# Patient Record
Sex: MALE | Race: WHITE | HISPANIC OR LATINO | URBAN - METROPOLITAN AREA
[De-identification: names, ages, dates, MRNs, and addresses within clinical notes are randomized per-mention and may not be internally consistent; named-entity substitution may affect disease eponyms.]

---

## 2017-09-18 ENCOUNTER — EMERGENCY (EMERGENCY)
Facility: HOSPITAL | Age: 32
LOS: 1 days | Discharge: HOME | End: 2017-09-18
Attending: EMERGENCY MEDICINE | Admitting: EMERGENCY MEDICINE
Payer: COMMERCIAL

## 2017-09-18 VITALS
OXYGEN SATURATION: 98 % | SYSTOLIC BLOOD PRESSURE: 132 MMHG | RESPIRATION RATE: 18 BRPM | WEIGHT: 179.9 LBS | HEIGHT: 73 IN | HEART RATE: 86 BPM | TEMPERATURE: 98 F | DIASTOLIC BLOOD PRESSURE: 87 MMHG

## 2017-09-18 DIAGNOSIS — F41.9 ANXIETY DISORDER, UNSPECIFIED: ICD-10-CM

## 2017-09-18 DIAGNOSIS — F41.0 PANIC DISORDER [EPISODIC PAROXYSMAL ANXIETY]: ICD-10-CM

## 2017-09-18 PROCEDURE — 99284 EMERGENCY DEPT VISIT MOD MDM: CPT | Mod: 25

## 2017-09-18 RX ORDER — ALPRAZOLAM 0.25 MG
0.5 TABLET ORAL ONCE
Qty: 0 | Refills: 0 | Status: DISCONTINUED | OUTPATIENT
Start: 2017-09-18 | End: 2017-09-18

## 2017-09-18 RX ADMIN — Medication 0.5 MILLIGRAM(S): at 22:27
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
## 2017-09-18 NOTE — ED PROVIDER NOTE - MEDICAL DECISION MAKING DETAILS
Pt given Xanax for acute panic attack.  Discussed anxiety at length.  He ultimately feels better and requests discharge.  Instructed to f/u with his PCP and therapist and continue his work on CBT.  Pt is happy with d/c plan and promises to f/u.
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
## 2019-02-25 PROBLEM — Z00.00 ENCOUNTER FOR PREVENTIVE HEALTH EXAMINATION: Status: ACTIVE | Noted: 2019-02-25

## 2019-02-25 PROBLEM — F41.9 ANXIETY DISORDER, UNSPECIFIED: Chronic | Status: ACTIVE | Noted: 2017-09-18

## 2019-03-01 ENCOUNTER — OUTPATIENT (OUTPATIENT)
Dept: OUTPATIENT SERVICES | Facility: HOSPITAL | Age: 34
LOS: 1 days | End: 2019-03-01

## 2019-03-01 ENCOUNTER — APPOINTMENT (OUTPATIENT)
Dept: MRI IMAGING | Facility: CLINIC | Age: 34
End: 2019-03-01
Payer: COMMERCIAL

## 2019-03-01 PROCEDURE — 70551 MRI BRAIN STEM W/O DYE: CPT | Mod: 26

## 2024-07-29 ENCOUNTER — APPOINTMENT (OUTPATIENT)
Dept: COLORECTAL SURGERY | Facility: CLINIC | Age: 39
End: 2024-07-29
Payer: MEDICAID

## 2024-07-29 VITALS
BODY MASS INDEX: 25.18 KG/M2 | OXYGEN SATURATION: 98 % | DIASTOLIC BLOOD PRESSURE: 77 MMHG | SYSTOLIC BLOOD PRESSURE: 126 MMHG | HEIGHT: 73 IN | WEIGHT: 190 LBS | HEART RATE: 87 BPM | RESPIRATION RATE: 16 BRPM

## 2024-07-29 VITALS
HEART RATE: 87 BPM | BODY MASS INDEX: 25.18 KG/M2 | HEIGHT: 73 IN | SYSTOLIC BLOOD PRESSURE: 166 MMHG | OXYGEN SATURATION: 98 % | DIASTOLIC BLOOD PRESSURE: 81 MMHG | WEIGHT: 190 LBS

## 2024-07-29 DIAGNOSIS — Z87.2 PERSONAL HISTORY OF DISEASES OF THE SKIN AND SUBCUTANEOUS TISSUE: ICD-10-CM

## 2024-07-29 DIAGNOSIS — K64.9 UNSPECIFIED HEMORRHOIDS: ICD-10-CM

## 2024-07-29 DIAGNOSIS — Z86.59 PERSONAL HISTORY OF OTHER MENTAL AND BEHAVIORAL DISORDERS: ICD-10-CM

## 2024-07-29 DIAGNOSIS — Z78.9 OTHER SPECIFIED HEALTH STATUS: ICD-10-CM

## 2024-07-29 DIAGNOSIS — F16.10 HALLUCINOGEN ABUSE, UNCOMPLICATED: ICD-10-CM

## 2024-07-29 DIAGNOSIS — F19.90 OTHER PSYCHOACTIVE SUBSTANCE USE, UNSPECIFIED, UNCOMPLICATED: ICD-10-CM

## 2024-07-29 PROCEDURE — 99204 OFFICE O/P NEW MOD 45 MIN: CPT | Mod: 25

## 2024-07-29 PROCEDURE — 46600 DIAGNOSTIC ANOSCOPY SPX: CPT

## 2024-07-29 NOTE — ASSESSMENT
[FreeTextEntry1] : Exam findings and diagnosis were discussed at length with patient.  Recommendations including increased fiber intake, adequate daily hydration, and sitz baths as needed and after bowel movements were discussed. Avoid constipation and diarrhea, avoid pushing/straining. Recommend fiber supplement to bulk stool.  Toilet behaviors discussed. Recommend decreasing time sitting on toilet. Medical management, such as topical cream/suppositories, was discussed. Office based treatment and surgery were discussed. Risks and benefits discussed. Recommend rubber band ligation. Patient understands multiple treatments may be performed for multiple hemorrhoids. He states he is interested in treatment, but requests starting treatment at a future visit and will schedule a follow up appointment.  All questions answered, patient expressed understanding and is agreeable to this plan.

## 2024-07-29 NOTE — HISTORY OF PRESENT ILLNESS
[FreeTextEntry1] : 39yo male presents for initial evaluation  PCP Dr Osullivan  [facility] anxiety (treating with psychotherapy), ezcema, dermatofibrosarcoma (left upper back) Denies medications.  PSH excision of dermatofibrosarcoma at age 10 in [place]  Social - denies tobacco use, reports prior use of LSD and MDMA in past/around 2016, drinks 3-6 alcoholic beverages per week.  Referred by PCP for evaluation of hemorrhoids  Patient thinks he has had hemorrhoids since 2014. Has used preparation H in past.  Denies anorectal pain. Patient reports episodes of BRBPR with BM, occurring on and off about 1 year ago and now occurring more frequently in past month. Sometimes in toilet bowl, sometimes with wiping.  Last saw BRB on TP this week. Last week saw in toilet bowl.  BM are daily, every morning. States stools are looser at baseline.  Denies taking fiber supplements or antidiarrheal medication. Denies stool softeners or laxatives.  Admits to sitting on toilet for long time, has a morning routine when he checks work emails on toilet.   Denies family history of CRC or IBD. Denies prior colonoscopy.  (+) sexually active, denies anally receptive intercourse or play.   Denies ASA or anticoagulation.

## 2024-07-29 NOTE — PHYSICAL EXAM
[FreeTextEntry1] : Medical assistant present for duration of physical examination   General no acute distress, alert and oriented Psych calm, anxious at times, pleasant demeanor, responding appropriately to questions Nonlabored breathing Ambulating without assistance Skin normal color and pigment, no visible lesions or rashes    Anorectal Exam: Inspection no erythema, induration or fluctuance, no skin excoriation, no fissure, soft external hemorrhoids without acute inflammation or thrombosis NAVI nontender, no masses palpated, no blood on gloved finger   Procedure: Anoscopy   Pre procedure Diagnosis: hemorrhoids Post procedure Diagnosis: hemorrhoids  Anesthesia: none Estimated blood loss: none Specimen: none Complications: none   Consent obtained. Anoscopy was performed by passing a lighted anoscope with lubricant jelly into the anal canal and the entire anal mucosal surface was inspected. Findings included no fissure, moderate to large internal hemorrhoids with inflammation, bleeding seen from mucosa of right anterior hemorrhoid   Patient tolerated examination and procedure well.

## 2024-08-19 ENCOUNTER — APPOINTMENT (OUTPATIENT)
Dept: COLORECTAL SURGERY | Facility: CLINIC | Age: 39
End: 2024-08-19
Payer: MEDICAID

## 2024-08-19 ENCOUNTER — TRANSCRIPTION ENCOUNTER (OUTPATIENT)
Age: 39
End: 2024-08-19

## 2024-08-19 VITALS
RESPIRATION RATE: 16 BRPM | BODY MASS INDEX: 25.18 KG/M2 | OXYGEN SATURATION: 97 % | WEIGHT: 190 LBS | DIASTOLIC BLOOD PRESSURE: 86 MMHG | TEMPERATURE: 98.2 F | SYSTOLIC BLOOD PRESSURE: 153 MMHG | HEIGHT: 73 IN | HEART RATE: 71 BPM

## 2024-08-19 DIAGNOSIS — K64.9 UNSPECIFIED HEMORRHOIDS: ICD-10-CM

## 2024-08-19 PROCEDURE — 46221 LIGATION OF HEMORRHOID(S): CPT

## 2024-08-19 NOTE — HISTORY OF PRESENT ILLNESS
[FreeTextEntry1] : 37yo male presents for follow up  PCP Dr Osullivan  [facility] anxiety (treating with psychotherapy), ezcema, dermatofibrosarcoma (left upper back) Denies medications. PSH excision of dermatofibrosarcoma at age 10 in [place]  Social - denies tobacco use, reports prior use of LSD and MDMA in past/around 2016, drinks 3-6 alcoholic beverages per week.  Denies family history of CRC or IBD. Denies prior colonoscopy.  Seen 7/29/24 for initial evaluation of hemorrhoids, c/o episodes of BRBPR with BM on and off x 1 year, now occurring more frequently. Exam including anoscopy noted soft external hemorrhoids, moderate to large internal hemorrhoids with inflammation, bleeding seen from mucosa of right anterior hemorrhoid. Recommended rubber band ligation, patient interested in treatment, but requested to start treatment at a future visit.  Returns today in follow up Since last visit has been taking Metamucil regularly. Stools are more formed. BRB has also improved while on Metamucil. Sees BRB 1-2 times per week  Noticed worse bleeding and increased frequency when drinking alcohol and taking Metamucil, so patient has stopped taking Metamucil when he goes out and plans to drink alcohol. BMs typically daily.  Doing breathing exercises.  He reports he is anxious about doing treatments for hemorrhoids, but also states he wants to treat his symptoms and would like to proceed with rubber band ligation treatment, starting today.  Denies ASA or anticoagulation

## 2024-08-19 NOTE — PROCEDURE
[FreeTextEntry1] : Rubber Band Ligation  Pre-procedure Diagnosis: Symptomatic Internal Hemorrhoids Post-procedure Diagnosis: Symptomatic Internal Hemorrhoids Procedure: Anoscopy with Rubber Band Ligation Anesthesia: none Estimated blood loss: minimal Specimen: none Drain: none Complications: none  Indications: Patient presents with history of symptoms related to internal hemorrhoids. On physical exam, large internal hemorrhoids were detected. Risks/benefits/alternative were discussed and patient agreed to proceed with office based procedure.  Procedure Details: Consent obtained. Patient was placed in a modified prone annalisa-knife position on the examination table. Digital rectal exam was performed with an index finger with surgical jelly lubricant. An anoscope was inserted into the anal canal. Using the hemorrhoid ligation device, a rubber band was placed around the right anterior hemorrhoid. No active bleeding was noted. The anoscope was removed. The patient tolerated the procedure well.

## 2024-08-19 NOTE — ASSESSMENT
[FreeTextEntry1] : s/p rubber band ligation of right anterior hemorrhoid. Post-procedure instructions were reviewed with the patient, including recommendation to notify office immediately for any symptoms of severe pain, bleeding, inability to urinate, fever, or any other concern. F/u 3-4 weeks All questions were answered, patient expressed understanding, and is agreeable to this plan.

## 2024-08-19 NOTE — PHYSICAL EXAM
[FreeTextEntry1] : Medical assistant present for duration of physical examination   General no acute distress, alert and oriented Psych anxious, responding appropriately to questions Nonlabored breathing Ambulating without assistance    Anorectal Exam: Inspection no cellulitis or skin changes, no fissure, soft external hemorrhoids without acute inflammation or thrombosis NAVI nontender, no masses palpated, no blood on gloved finger

## 2024-09-16 ENCOUNTER — APPOINTMENT (OUTPATIENT)
Dept: COLORECTAL SURGERY | Facility: CLINIC | Age: 39
End: 2024-09-16
Payer: MEDICAID

## 2024-09-16 VITALS
DIASTOLIC BLOOD PRESSURE: 93 MMHG | HEIGHT: 73 IN | OXYGEN SATURATION: 99 % | BODY MASS INDEX: 25.18 KG/M2 | HEART RATE: 88 BPM | SYSTOLIC BLOOD PRESSURE: 159 MMHG | WEIGHT: 190 LBS

## 2024-09-16 DIAGNOSIS — K64.9 UNSPECIFIED HEMORRHOIDS: ICD-10-CM

## 2024-09-16 PROCEDURE — 46221 LIGATION OF HEMORRHOID(S): CPT

## 2024-09-16 NOTE — PHYSICAL EXAM
[FreeTextEntry1] : Medical assistant present for duration of physical examination   General no acute distress, alert and oriented Psych anxious, responding appropriately to questions Nonlabored breathing Ambulating without assistance    Anorectal Exam: Inspection no cellulitis or skin changes, no fissure, soft external hemorrhoids, no fissure NAVI nontender, no masses palpated, no blood on gloved finger

## 2024-09-16 NOTE — HISTORY OF PRESENT ILLNESS
[FreeTextEntry1] : 39yo male presents for follow up  PCP Dr Osullivan  [facility] anxiety (treating with psychotherapy), ezcema, dermatofibrosarcoma (left upper back) Denies medications. PSH excision of dermatofibrosarcoma at age 10 in [place]  Social - denies tobacco use, reports prior use of LSD and MDMA in past/around 2016, drinks 3-6 alcoholic beverages per week.  Denies family history of CRC or IBD. Denies prior colonoscopy.  Seen 7/29/24 for initial evaluation of hemorrhoids, c/o episodes of BRBPR with BM on and off x 1 year, now occurring more frequently. Exam including anoscopy noted soft external hemorrhoids, moderate to large internal hemorrhoids with inflammation, bleeding seen from mucosa of right anterior hemorrhoid. Recommended rubber band ligation, patient interested in treatment, but requested to start treatment at a future visit.  Seen 8/19/24  s/p right anterior rubber band ligation  Returns today in follow up Felt pressure/discomfort for about 7 days post procedure, otherwise tolerated well. Denies fevers. Took Advil as needed, usually before bed to help sleep.  Thinks he is making progress regarding his symptoms overall. Stools are solid. If does not take Metamucil or if drinks alcohol, stools are looser and notes BRB with BM, but notes the amount is less than in past.

## 2024-09-16 NOTE — ASSESSMENT
[FreeTextEntry1] : Hemorrhoid symptoms improving but not fully resolved since initiation of rubber banding 2nd treatment performed today - right posterior. Post-procedure instructions were reviewed with the patient Continue bowel regimen F/u 1 month All questions were answered.

## 2024-09-16 NOTE — PROCEDURE
[FreeTextEntry1] : Rubber Band Ligation  Pre-procedure Diagnosis: Symptomatic Internal Hemorrhoids Post-procedure Diagnosis: Symptomatic Internal Hemorrhoids Procedure: Anoscopy with Rubber Band Ligation Anesthesia: none Estimated blood loss: minimal Specimen: none Drain: none Complications: none  Procedure Details: Consent obtained. Patient was placed in a modified prone annalisa-knife position on the examination table. Digital rectal exam was performed with an index finger with surgical jelly lubricant. An anoscope was inserted into the anal canal. A well healed post ligation site was seen right anterior anal canal.  Using the hemorrhoid ligation device, a rubber band was placed around the right posterior hemorrhoid. No active bleeding was noted. The anoscope was removed. The patient tolerated the procedure well.

## 2024-10-28 ENCOUNTER — NON-APPOINTMENT (OUTPATIENT)
Age: 39
End: 2024-10-28

## 2024-10-28 ENCOUNTER — APPOINTMENT (OUTPATIENT)
Dept: COLORECTAL SURGERY | Facility: CLINIC | Age: 39
End: 2024-10-28
Payer: MEDICAID

## 2024-10-28 VITALS
TEMPERATURE: 98.2 F | SYSTOLIC BLOOD PRESSURE: 146 MMHG | BODY MASS INDEX: 25.18 KG/M2 | DIASTOLIC BLOOD PRESSURE: 96 MMHG | HEART RATE: 76 BPM | OXYGEN SATURATION: 97 % | WEIGHT: 190 LBS | HEIGHT: 73 IN | RESPIRATION RATE: 16 BRPM

## 2024-10-28 DIAGNOSIS — K64.9 UNSPECIFIED HEMORRHOIDS: ICD-10-CM

## 2024-10-28 PROCEDURE — 46221 LIGATION OF HEMORRHOID(S): CPT

## 2024-12-09 ENCOUNTER — APPOINTMENT (OUTPATIENT)
Dept: COLORECTAL SURGERY | Facility: CLINIC | Age: 39
End: 2024-12-09
Payer: MEDICAID

## 2024-12-09 VITALS
RESPIRATION RATE: 16 BRPM | HEART RATE: 89 BPM | SYSTOLIC BLOOD PRESSURE: 144 MMHG | TEMPERATURE: 97.6 F | BODY MASS INDEX: 25.18 KG/M2 | DIASTOLIC BLOOD PRESSURE: 76 MMHG | HEIGHT: 73 IN | OXYGEN SATURATION: 99 % | WEIGHT: 190 LBS

## 2024-12-09 DIAGNOSIS — K64.9 UNSPECIFIED HEMORRHOIDS: ICD-10-CM

## 2024-12-09 PROCEDURE — 99213 OFFICE O/P EST LOW 20 MIN: CPT
